# Patient Record
Sex: MALE | Race: BLACK OR AFRICAN AMERICAN | NOT HISPANIC OR LATINO | URBAN - METROPOLITAN AREA
[De-identification: names, ages, dates, MRNs, and addresses within clinical notes are randomized per-mention and may not be internally consistent; named-entity substitution may affect disease eponyms.]

---

## 2024-10-18 ENCOUNTER — APPOINTMENT (OUTPATIENT)
Dept: ORTHOPEDIC SURGERY | Facility: CLINIC | Age: 64
End: 2024-10-18
Payer: MEDICARE

## 2024-10-23 ENCOUNTER — HOSPITAL ENCOUNTER (OUTPATIENT)
Dept: RADIOLOGY | Facility: CLINIC | Age: 64
Discharge: HOME | End: 2024-10-23
Payer: MEDICARE

## 2024-10-23 ENCOUNTER — APPOINTMENT (OUTPATIENT)
Dept: ORTHOPEDIC SURGERY | Facility: CLINIC | Age: 64
End: 2024-10-23
Payer: MEDICARE

## 2024-10-23 ENCOUNTER — OFFICE VISIT (OUTPATIENT)
Dept: ORTHOPEDIC SURGERY | Facility: CLINIC | Age: 64
End: 2024-10-23
Payer: MEDICARE

## 2024-10-23 DIAGNOSIS — S82.202S CLOSED FRACTURE OF LEFT TIBIA AND FIBULA, SEQUELA: Primary | ICD-10-CM

## 2024-10-23 DIAGNOSIS — S82.402S CLOSED FRACTURE OF LEFT TIBIA AND FIBULA, SEQUELA: ICD-10-CM

## 2024-10-23 DIAGNOSIS — S82.402S CLOSED FRACTURE OF LEFT TIBIA AND FIBULA, SEQUELA: Primary | ICD-10-CM

## 2024-10-23 DIAGNOSIS — S82.202S CLOSED FRACTURE OF LEFT TIBIA AND FIBULA, SEQUELA: ICD-10-CM

## 2024-10-23 PROCEDURE — 73560 X-RAY EXAM OF KNEE 1 OR 2: CPT | Mod: LT

## 2024-10-23 PROCEDURE — 73590 X-RAY EXAM OF LOWER LEG: CPT | Mod: LT

## 2024-10-23 PROCEDURE — 99213 OFFICE O/P EST LOW 20 MIN: CPT | Performed by: PHYSICIAN ASSISTANT

## 2024-10-23 RX ORDER — ALBUTEROL SULFATE 90 UG/1
INHALANT RESPIRATORY (INHALATION)
COMMUNITY
Start: 2024-10-18

## 2024-10-23 RX ORDER — HYDROCHLOROTHIAZIDE 25 MG/1
1 TABLET ORAL DAILY
COMMUNITY

## 2024-10-23 RX ORDER — AMLODIPINE BESYLATE 5 MG/1
1 TABLET ORAL DAILY
COMMUNITY

## 2024-10-23 RX ORDER — HYDROXYZINE HYDROCHLORIDE 10 MG/1
TABLET, FILM COATED ORAL
COMMUNITY
Start: 2024-10-18

## 2024-10-23 RX ORDER — ATORVASTATIN CALCIUM 40 MG/1
40 TABLET, FILM COATED ORAL NIGHTLY
COMMUNITY
Start: 2024-06-03

## 2024-10-23 RX ORDER — HYDROCODONE BITARTRATE AND ACETAMINOPHEN 5; 325 MG/1; MG/1
TABLET ORAL
COMMUNITY
Start: 2023-10-28

## 2024-10-23 RX ORDER — CYCLOBENZAPRINE HCL 10 MG
TABLET ORAL 3 TIMES DAILY PRN
COMMUNITY

## 2024-10-23 ASSESSMENT — PAIN SCALES - GENERAL: PAINLEVEL_OUTOF10: 9

## 2024-10-23 ASSESSMENT — PAIN - FUNCTIONAL ASSESSMENT: PAIN_FUNCTIONAL_ASSESSMENT: 0-10

## 2024-10-23 ASSESSMENT — PAIN DESCRIPTION - DESCRIPTORS: DESCRIPTORS: ACHING

## 2024-10-24 NOTE — PROGRESS NOTES
History of Present Illness  Ted Tijerina is a 64 y.o. known to our office for history of proximal left tibial shaft fracture status post ORIF/IM nail on 10/8/2019.  He is coming today for complaints of left knee and ankle pain.  His pain is mostly noticed with change in weather and described as an aching/pulling sensation that is increased with range of motion and improved by nothing.  He has had the pain intermittently over the past couple of years but as of more recently it has become pretty severe.  He has had no new trauma or injury.  He has no other complaints or concerns at this time.     History reviewed. No pertinent past medical history.    Medication Documentation Review Audit       Reviewed by Brandan Haley on 10/23/24 at 1550      Medication Order Taking? Sig Documenting Provider Last Dose Status   albuterol 90 mcg/actuation inhaler [id number] Yes  Historical Provider, MD  Active   amLODIPine (Norvasc) 5 mg tablet [id number]  Take 1 tablet (5 mg) by mouth once daily. Historical Provider, MD  Active   atorvastatin (Lipitor) 40 mg tablet [id number] Yes Take 1 tablet (40 mg) by mouth once daily at bedtime. Historical Provider, MD  Active   cyclobenzaprine (Flexeril) 10 mg tablet [id number]  Take by mouth 3 times a day as needed. Historical Provider, MD  Active   hydroCHLOROthiazide (HYDRODiuril) 25 mg tablet [id number]  Take 1 tablet (25 mg) by mouth once daily. Historical Provider, MD  Active   HYDROcodone-acetaminophen (Norco) 5-325 mg tablet [id number] Yes TAKE 1 TABLET BY MOUTH EVERY 8 HOURS AS NEEDED FOR PAIN FOR UP TO 1 DAY. Historical Provider, MD  Active   hydrOXYzine HCL (Atarax) 10 mg tablet [id number] Yes  Historical Provider, MD  Active                    No Known Allergies    Social History     Socioeconomic History    Marital status:      Spouse name: Not on file    Number of children: Not on file    Years of education: Not on file    Highest education level: Not on file   Occupational  History    Not on file   Tobacco Use    Smoking status: Not on file    Smokeless tobacco: Not on file   Substance and Sexual Activity    Alcohol use: Not on file    Drug use: Not on file    Sexual activity: Not on file   Other Topics Concern    Not on file   Social History Narrative    Not on file     Social Drivers of Health     Financial Resource Strain: Not on file   Food Insecurity: Not on file   Transportation Needs: Not on file   Physical Activity: Not on file   Stress: Not on file   Social Connections: Not on file   Intimate Partner Violence: Not on file   Housing Stability: Not on file       History reviewed. No pertinent surgical history.     Review of Systems   30 point ROS reviewed and negative other than as listed in the HPI.      Exam  Gen: The pt is A&Ox3, NAD, and appear state age and weight  Psychiatric: mood and affect are appropriate   Eyes: sclera are white, EOM grossly intact  ENT: MMM  Neck: supple, thyroid is midline  Respiratory: respirations are nonlabored, chest rise symmetric  CV: rate is regular by palpation of distal pulses  Abdomen: nondistended   Integument: no obvious cutaneous lesions noted. No signs of lymphangitis. No signs of systemic edema.   MSK:  Left lower extremity with multiple well-healed surgical incisions.  He has full range of motion of the knee and ankle.  He does have prominent hardware in his proximal tibia as well as his medial ankle with tenderness to palpation over top.  No swelling appreciated.  He has 5 out of 5 strength throughout the left lower extremity.    Intact flexion and extension of digits  Neurovascular exam normal distally  2+ dorsalis pedis pulse and good cap refill     Imaging:  I personally reviewed multiple views of the left knee and tibia were obtained in the office today demonstrate well-healed fracture of the left proximal tibia with intact hardware that is unchanged in alignment from previous imaging obtained in April 2021     Assessment    64-year-old male with past surgical history of left tibia ORIF/IM nail with left knee and ankle pain    Plan:  I reviewed his imaging in detail with him today.  His injury is all well-healed.  He does have some arthritis in his knee on imaging but his pain seems to be more over the proximal tibia than it does his knee.  We discussed the possibility of hardware removal but that there is no guarantee that this will resolve his pain.  The risk and benefit of surgery was discussed and he verbalized interest in proceeding with hardware removal.  I will review his case with Dr. Camacho to determine if he thinks it would be appropriate to try and give the patient a call back.  In the meantime he can continue over-the-counter medications as needed for pain and activity as tolerated.    Natural history reviewed. All of the pt questions/concerns addressed and they are in agreement with the plan.

## 2024-10-29 ENCOUNTER — PREP FOR PROCEDURE (OUTPATIENT)
Dept: OPERATING ROOM | Facility: HOSPITAL | Age: 64
End: 2024-10-29
Payer: MEDICARE

## 2025-01-03 ENCOUNTER — PRE-ADMISSION TESTING (OUTPATIENT)
Dept: PREADMISSION TESTING | Facility: HOSPITAL | Age: 65
End: 2025-01-03
Payer: MEDICARE

## 2025-01-03 VITALS
SYSTOLIC BLOOD PRESSURE: 135 MMHG | RESPIRATION RATE: 16 BRPM | BODY MASS INDEX: 23.08 KG/M2 | OXYGEN SATURATION: 99 % | DIASTOLIC BLOOD PRESSURE: 89 MMHG | TEMPERATURE: 98.4 F | HEART RATE: 90 BPM | HEIGHT: 73 IN | WEIGHT: 174.16 LBS

## 2025-01-03 DIAGNOSIS — Z96.642 PAIN DUE TO LEFT HIP JOINT PROSTHESIS, INITIAL ENCOUNTER (CMS-HCC): ICD-10-CM

## 2025-01-03 DIAGNOSIS — Z79.899 LONG TERM USE OF DRUG: ICD-10-CM

## 2025-01-03 DIAGNOSIS — T84.84XA PAIN DUE TO LEFT HIP JOINT PROSTHESIS, INITIAL ENCOUNTER (CMS-HCC): ICD-10-CM

## 2025-01-03 DIAGNOSIS — Z01.818 PREOP TESTING: Primary | ICD-10-CM

## 2025-01-03 LAB
AMPHETAMINES UR QL SCN: ABNORMAL
ANION GAP SERPL CALC-SCNC: 12 MMOL/L (ref 10–20)
ATRIAL RATE: 88 BPM
BARBITURATES UR QL SCN: ABNORMAL
BASOPHILS # BLD AUTO: 0.02 X10*3/UL (ref 0–0.1)
BASOPHILS NFR BLD AUTO: 0.3 %
BENZODIAZ UR QL SCN: ABNORMAL
BUN SERPL-MCNC: 10 MG/DL (ref 6–23)
BZE UR QL SCN: ABNORMAL
CALCIUM SERPL-MCNC: 10.5 MG/DL (ref 8.6–10.3)
CANNABINOIDS UR QL SCN: ABNORMAL
CHLORIDE SERPL-SCNC: 100 MMOL/L (ref 98–107)
CO2 SERPL-SCNC: 30 MMOL/L (ref 21–32)
CREAT SERPL-MCNC: 0.87 MG/DL (ref 0.5–1.3)
EGFRCR SERPLBLD CKD-EPI 2021: >90 ML/MIN/1.73M*2
EOSINOPHIL # BLD AUTO: 0.07 X10*3/UL (ref 0–0.7)
EOSINOPHIL NFR BLD AUTO: 0.9 %
ERYTHROCYTE [DISTWIDTH] IN BLOOD BY AUTOMATED COUNT: 17.6 % (ref 11.5–14.5)
FENTANYL+NORFENTANYL UR QL SCN: ABNORMAL
GLUCOSE SERPL-MCNC: 101 MG/DL (ref 74–99)
HCT VFR BLD AUTO: 43.8 % (ref 41–52)
HGB BLD-MCNC: 13.8 G/DL (ref 13.5–17.5)
IMM GRANULOCYTES # BLD AUTO: 0.01 X10*3/UL (ref 0–0.7)
IMM GRANULOCYTES NFR BLD AUTO: 0.1 % (ref 0–0.9)
LYMPHOCYTES # BLD AUTO: 2.06 X10*3/UL (ref 1.2–4.8)
LYMPHOCYTES NFR BLD AUTO: 25.9 %
MCH RBC QN AUTO: 22.2 PG (ref 26–34)
MCHC RBC AUTO-ENTMCNC: 31.5 G/DL (ref 32–36)
MCV RBC AUTO: 71 FL (ref 80–100)
METHADONE UR QL SCN: ABNORMAL
MONOCYTES # BLD AUTO: 0.71 X10*3/UL (ref 0.1–1)
MONOCYTES NFR BLD AUTO: 8.9 %
NEUTROPHILS # BLD AUTO: 5.08 X10*3/UL (ref 1.2–7.7)
NEUTROPHILS NFR BLD AUTO: 63.9 %
NRBC BLD-RTO: ABNORMAL /100{WBCS}
OPIATES UR QL SCN: ABNORMAL
OXYCODONE+OXYMORPHONE UR QL SCN: ABNORMAL
P AXIS: 69 DEGREES
P OFFSET: 172 MS
P ONSET: 115 MS
PCP UR QL SCN: ABNORMAL
PLATELET # BLD AUTO: 297 X10*3/UL (ref 150–450)
POTASSIUM SERPL-SCNC: 3.1 MMOL/L (ref 3.5–5.3)
PR INTERVAL: 210 MS
Q ONSET: 220 MS
QRS COUNT: 14 BEATS
QRS DURATION: 92 MS
QT INTERVAL: 362 MS
QTC CALCULATION(BAZETT): 438 MS
QTC FREDERICIA: 411 MS
R AXIS: 50 DEGREES
RBC # BLD AUTO: 6.21 X10*6/UL (ref 4.5–5.9)
SODIUM SERPL-SCNC: 139 MMOL/L (ref 136–145)
T AXIS: 4 DEGREES
T OFFSET: 401 MS
VENTRICULAR RATE: 88 BPM
WBC # BLD AUTO: 8 X10*3/UL (ref 4.4–11.3)

## 2025-01-03 PROCEDURE — 80349 CANNABINOIDS NATURAL: CPT | Mod: BEALAB

## 2025-01-03 PROCEDURE — 99204 OFFICE O/P NEW MOD 45 MIN: CPT | Performed by: NURSE PRACTITIONER

## 2025-01-03 PROCEDURE — 36415 COLL VENOUS BLD VENIPUNCTURE: CPT

## 2025-01-03 PROCEDURE — 93010 ELECTROCARDIOGRAM REPORT: CPT | Performed by: INTERNAL MEDICINE

## 2025-01-03 PROCEDURE — 80048 BASIC METABOLIC PNL TOTAL CA: CPT

## 2025-01-03 PROCEDURE — 80307 DRUG TEST PRSMV CHEM ANLYZR: CPT | Mod: BEALAB

## 2025-01-03 PROCEDURE — 85025 COMPLETE CBC W/AUTO DIFF WBC: CPT

## 2025-01-03 PROCEDURE — 93005 ELECTROCARDIOGRAM TRACING: CPT

## 2025-01-03 RX ORDER — LISINOPRIL 2.5 MG/1
2.5 TABLET ORAL DAILY
COMMUNITY

## 2025-01-03 ASSESSMENT — DUKE ACTIVITY SCORE INDEX (DASI)
CAN YOU WALK A BLOCK OR TWO ON LEVEL GROUND: NO
CAN YOU CLIMB A FLIGHT OF STAIRS OR WALK UP A HILL: YES
CAN YOU DO YARD WORK LIKE RAKING LEAVES, WEEDING OR PUSHING A MOWER: NO
CAN YOU DO LIGHT WORK AROUND THE HOUSE LIKE DUSTING OR WASHING DISHES: NO
CAN YOU WALK INDOORS, SUCH AS AROUND YOUR HOUSE: YES
CAN YOU PARTICIPATE IN STRENOUS SPORTS LIKE SWIMMING, SINGLES TENNIS, FOOTBALL, BASKETBALL, OR SKIING: NO
DASI METS SCORE: 3.6
CAN YOU DO MODERATE WORK AROUND THE HOUSE LIKE VACUUMING, SWEEPING FLOORS OR CARRYING GROCERIES: NO
CAN YOU TAKE CARE OF YOURSELF (EAT, DRESS, BATHE, OR USE TOILET): NO
CAN YOU DO HEAVY WORK AROUND THE HOUSE LIKE SCRUBBING FLOORS OR LIFTING AND MOVING HEAVY FURNITURE: NO
TOTAL_SCORE: 7.25
CAN YOU PARTICIPATE IN MODERATE RECREATIONAL ACTIVITIES LIKE GOLF, BOWLING, DANCING, DOUBLES TENNIS OR THROWING A BASEBALL OR FOOTBALL: NO
CAN YOU HAVE SEXUAL RELATIONS: NO
CAN YOU RUN A SHORT DISTANCE: NO

## 2025-01-03 ASSESSMENT — ENCOUNTER SYMPTOMS
ARTHRALGIAS: 1
NECK NEGATIVE: 1
RESPIRATORY NEGATIVE: 1
ENDOCRINE NEGATIVE: 1
EYES NEGATIVE: 1
GASTROINTESTINAL NEGATIVE: 1
MYALGIAS: 1
CARDIOVASCULAR NEGATIVE: 1
CONSTITUTIONAL NEGATIVE: 1
NEUROLOGICAL NEGATIVE: 1

## 2025-01-03 ASSESSMENT — LIFESTYLE VARIABLES: SMOKING_STATUS: SMOKER

## 2025-01-03 ASSESSMENT — PAIN SCALES - GENERAL: PAINLEVEL_OUTOF10: 10 - WORST POSSIBLE PAIN

## 2025-01-03 ASSESSMENT — PAIN - FUNCTIONAL ASSESSMENT: PAIN_FUNCTIONAL_ASSESSMENT: 0-10

## 2025-01-03 NOTE — CPM/PAT H&P
CPM/PAT Evaluation       Name: Ted Tijerina (Ted Tijerina)  /Age: 1960/64 y.o.     Visit Type:   In-Person       Chief Complaint: Pain due to L  hip joint prosthesis    HPI 65 yo male who is referred to PAT by Dr Camacho for evaluation in advance of his LLE hardware removal 25. He has a h/o ORIF/IM nail 10/8/2019 after sustaining a tibial shaft fracture. He now c/o L knee and ankle pain, described as aching/puling sensation.    See PMH below    Past Medical History:   Diagnosis Date    Arthritis     Asthma     Cerebral vascular accident (Multi)         COPD (chronic obstructive pulmonary disease) (Multi)     GERD (gastroesophageal reflux disease)     Hiatal hernia     Hyperlipidemia     Hypertension        Past Surgical History:   Procedure Laterality Date    ORIF TIBIA FRACTURE         Patient Sexual activity questions deferred to the physician.    Family History   Problem Relation Name Age of Onset    Diabetes Mother      Heart attack Father         No Known Allergies    Medication Documentation Review Audit       Reviewed by Marlene Martin RN (Registered Nurse) on 25 at 1325      Medication Order Taking? Sig Documenting Provider Last Dose Status   albuterol 90 mcg/actuation inhaler [id number] Yes  Historical Provider, MD 2025 Active   amLODIPine (Norvasc) 5 mg tablet [id number] Yes Take 1 tablet (5 mg) by mouth once daily. Historical Provider, MD 1/3/2025 Active   atorvastatin (Lipitor) 40 mg tablet [id number] Yes Take 1 tablet (40 mg) by mouth once daily at bedtime. Historical Provider, MD 1/3/2025 Active   cyclobenzaprine (Flexeril) 10 mg tablet [id number] Yes Take by mouth 3 times a day as needed. Historical Provider, MD Past Week Active   hydroCHLOROthiazide (HYDRODiuril) 25 mg tablet [id number] Yes Take 1 tablet (25 mg) by mouth once daily. Historical Provider, MD 1/3/2025 Active   HYDROcodone-acetaminophen (Norco) 5-325 mg tablet [id number] Yes TAKE 1 TABLET BY MOUTH EVERY 8 HOURS AS  "NEEDED FOR PAIN FOR UP TO 1 DAY. Historical Provider, MD Past Week Active   hydrOXYzine HCL (Atarax) 10 mg tablet [id number] Yes  Historical Provider, MD Past Week Active   lisinopril 2.5 mg tablet [id number] Yes Take 1 tablet (2.5 mg) by mouth once daily. Historical Provider, MD 1/3/2025 Active                         PAT ROS:   Constitutional:    He presents in a , he uses a walker at home.   neg    Neuro/Psych:   neg    Eyes:   neg    Ears:   neg    Nose:   neg    Mouth:   neg    Throat:   neg    Neck:   neg    Cardio:   neg    Respiratory:   neg    Endocrine:   neg    GI:   neg    :   neg    Musculoskeletal:    See HPI, he has back pain    arthralgias   myalgias  Hematologic:   neg    Skin:  neg        Physical Exam  Vitals and nursing note reviewed. Exam conducted with a chaperone present. Physical exam within normal limits.   Abdominal:      Comments: Abdominal hernia substernal    Psychiatric:      Comments: Shaking leg continuously stating he is in pain          PAT AIRWAY:   Airway:     Mallampati::  III    TM distance::  >3 FB    Neck ROM::  Full      Visit Vitals  /89   Pulse 90   Temp 36.9 °C (98.4 °F)   Resp 16   Ht 1.854 m (6' 1\")   Wt 79 kg (174 lb 2.6 oz)   SpO2 99%   BMI 22.98 kg/m²   Smoking Status Every Day   BSA 2.02 m²       DASI Risk Score      Flowsheet Row Pre-Admission Testing from 1/3/2025 in [facility]   Can you take care of yourself (eat, dress, bathe, or use toilet)?  0 filed at 01/03/2025 1414   Can you walk indoors, such as around your house? 1.75 filed at 01/03/2025 1414   Can you walk a block or two on level ground?  0 filed at 01/03/2025 1414   Can you climb a flight of stairs or walk up a hill? 5.5 filed at 01/03/2025 1414   Can you run a short distance? 0 filed at 01/03/2025 1414   Can you do light work around the house like dusting or washing dishes? 0 filed at 01/03/2025 1414   Can you do moderate work around the house like vacuuming, sweeping floors or " carrying groceries? 0 filed at 01/03/2025 1414   Can you do heavy work around the house like scrubbing floors or lifting and moving heavy furniture?  0 filed at 01/03/2025 1414   Can you do yard work like raking leaves, weeding or pushing a mower? 0 filed at 01/03/2025 1414   Can you have sexual relations? 0 filed at 01/03/2025 1414   Can you participate in moderate recreational activities like golf, bowling, dancing, doubles tennis or throwing a baseball or football? 0 filed at 01/03/2025 1414   Can you participate in strenous sports like swimming, singles tennis, football, basketball, or skiing? 0 filed at 01/03/2025 1414   DASI SCORE 7.25 filed at 01/03/2025 1414   METS Score (Will be calculated only when all the questions are answered) 3.6 filed at 01/03/2025 1414          Caprini DVT Assessment      Flowsheet Row Pre-Admission Testing from 1/3/2025 in [facility]   DVT Score 8 filed at 01/03/2025 1424   Surgical Factors Elective major lower extremity arthroplasty filed at 01/03/2025 1424   BMI 30 or less filed at 01/03/2025 1424          Modified Frailty Index    No data to display       CHADS2 Stroke Risk  Current as of 6 minutes ago        N/A 3 to 100%: High Risk   2 to < 3%: Medium Risk   0 to < 2%: Low Risk     Last Change: N/A          This score determines the patient's risk of having a stroke if the patient has atrial fibrillation.        This score is not applicable to this patient. Components are not calculated.          Revised Cardiac Risk Index      Flowsheet Row Pre-Admission Testing from 1/3/2025 in [facility]   High-Risk Surgery (Intraperitoneal, Intrathoracic,Suprainguinal vascular) 0 filed at 01/03/2025 1425   History of ischemic heart disease (History of MI, History of positive exercuse test, Current chest paint considered due to myocardial ischemia, Use of nitrate therapy, ECG with pathological Q Waves) 0 filed at 01/03/2025 1425   History of congestive heart  failure (pulmonary edemia, bilateral rales or S3 gallop, Paroxysmal nocturnal dyspnea, CXR showing pulmonary vascular redistribution) 0 filed at 01/03/2025 1425   History of cerebrovascular disease (Prior TIA or stroke) 1 filed at 01/03/2025 1425   Pre-operative insulin treatment 0 filed at 01/03/2025 1425   Pre-operative creatinine>2 mg/dl 0 filed at 01/03/2025 1425   Revised Cardiac Risk Calculator 1 filed at 01/03/2025 1425          Apfel Simplified Score      Flowsheet Row Pre-Admission Testing from 1/3/2025 in [facility]   Smoking status 0 filed at 01/03/2025 1425   History of motion sickness or PONV  0 filed at 01/03/2025 1425   Use of postoperative opioids 0 filed at 01/03/2025 1425   Gender - Female 0=No filed at 01/03/2025 1425   Apfel Simplified Score Calculator 0 filed at 01/03/2025 1425          Risk Analysis Index Results This Encounter    No data found in the last 10 encounters.       Stop Bang Score      Flowsheet Row Pre-Admission Testing from 1/3/2025 in [facility]   Do you snore loudly? 1 filed at 01/03/2025 1327   Do you often feel tired or fatigued after your sleep? 1 filed at 01/03/2025 1327   Has anyone ever observed you stop breathing in your sleep? 0 filed at 01/03/2025 1327   Do you have or are you being treated for high blood pressure? 1 filed at 01/03/2025 1327   Recent BMI (Calculated) 23 filed at 01/03/2025 1327   Is BMI greater than 35 kg/m2? 0=No filed at 01/03/2025 1327   Age older than 50 years old? 1=Yes filed at 01/03/2025 1327   Is your neck circumference greater than 17 inches (Male) or 16 inches (Female)? 0 filed at 01/03/2025 1327   Gender - Male 1=Yes filed at 01/03/2025 1327   STOP-BANG Total Score 5 filed at 01/03/2025 1327          Prodigy: High Risk  Total Score: 19              Prodigy Age Score      Prodigy Gender Score     Prodigy Previous Opioid Use Score           ARISCAT Score for Postoperative Pulmonary Complications      Flowsheet  Row Pre-Admission Testing from 1/3/2025 in [facility]   Age, years  3 filed at 01/03/2025 1426   Preoperative SpO2 0 filed at 01/03/2025 1426   Respiratory infection in the last month Either upper or lower (i.e., URI, bronchitis, pneumonia), with fever and antibiotic treatment 0 filed at 01/03/2025 1426   Preoperative anemoa (Hgb less than 10 g/dl) 0 filed at 01/03/2025 1426   Surgical incision  0 filed at 01/03/2025 1426   Duration of surgery  16 filed at 01/03/2025 1426   Emergency Procedure  0 filed at 01/03/2025 1426   ARISCAT Total Score  19 filed at 01/03/2025 1426          Lydia Perioperative Risk for Myocardial Infarction or Cardiac Arrest (JANICE)      Flowsheet Row Pre-Admission Testing from 1/3/2025 in [facility]   Age 1.26 filed at 01/03/2025 1426   Functional Status  0.65 filed at 01/03/2025 1426   ASA Class  -1.92 filed at 01/03/2025 1426   Creatinine 0 filed at 01/03/2025 1426   Type of Procedure  0.80 filed at 01/03/2025 1426   JANICE Total Score  -4.46 filed at 01/03/2025 1426   JANICE % 1.14 filed at 01/03/2025 1426          Assessment and Plan   65 yo male presents to [facility] for risk assessment and preoperative optimization in advance of orthopedic surgery. Today his VS are stable, he is afebrile, his pulse ox is 98% on room air at rest. States his knee pain in 10/10. His daughter is in attendance. Wife is on phone  via [employer].     Neuro:  No diagnosis or significant findings on chart review or clinical presentation and evaluation. He is at risk of stroke due to prior stroke and co morbidities    Wife states he has had a stroke. I do not see any neurology notes. I don't appreciate any weakness UE/LE's, speech clear.  CT head 10/5/2019 completed at time of hospitalization for trauma, per report:   IMPRESSION:  CT head:   Diffuse supratentorial cortical volume loss with supratentorial  nonspecific white matter changes. Likely remote lacunar  infarction-right temporal  lobe.   Right sphenoid sinus disease.   CT cervical spine:  Cervical spine degenerative changes.  Asymmetric fullness left piriform sinus, recommend ENT follow-up.   CT abdomen/pelvis:   Concern for gallbladder sludge.   Left adrenal gland hyperplasia versus adenoma.   Retained colonic fecal debris. Some submucosal edema ascending colon  correlate if history of colitis/enteritis.  Large right hip lipomatous structure, recommend follow-up    HEENT/Airway:  No diagnosis or significant findings on chart review or clinical presentation and evaluation.     Cardiovascular:  Denies chest pain, shortness of breathe, dizziness, palpations, or lightheadedness    HTN- BP is stable, continue Amlodipine as prescribed, continue lisinopril as prescribed since it is a night med last dose will be 1/15/25 evening    HPL - No recent lipid panel- continue atorvastatin as prescribed    Acceptable surgical risk. No additional preoperative testing is currently indicated.    EKG - 1/3/25 preliminary  SR with 1st degree AVB, nonspecific ST and T wave no comparison    Pulmonary:  COPD, uses Pro Air as rescue inhaler if needed  Currently smoking  Patient has history of nicotine dependency. Smoking cessation education was provided to the patient.Cessation encouraged. - Physiological and physical aspects of tobacco addiction as well as strategies for quitting were discussed. - Counseling was given focusing on the harmful effects of this addiction especially given the patient's medical condition(s) which will be worsened because of the chemicals in tobacco    PRODIGY: High risk for opioid induced respiratory depression  Discussed smoking cessation and deep breathing handout given    Renal:   No diagnosis or significant findings on chart review, or clinical presentation and evaluation.     Pt at Moderate risk for perioperative RUBINA based on Dynamic Predictive Scoring Tool for Perioperative RUBINA  Lab Results   Component Value Date    GLUCOSE 86  "10/09/2019    CALCIUM 8.6 10/09/2019     (L) 10/09/2019    K 3.7 10/09/2019    CO2 27 10/09/2019    CL 99 10/09/2019    BUN 9 10/09/2019    CREATININE 0.74 10/09/2019       Endocrine:  No diagnosis or significant findings on chart review or clinical presentation and evaluation.     No results found for: \"HGBA1C\"    Hematologic:  No diagnosis or significant findings on chart review or clinical presentation and evaluation.  Lab Results   Component Value Date    WBC 12.0 (H) 10/08/2019    HGB 11.1 (L) 10/08/2019    HCT 34.5 (L) 10/08/2019    MCV 78 (L) 10/08/2019     10/08/2019       Pt supplied education/VTE handout    Gastrointestinal:   No diagnosis or significant findings on chart review or clinical presentation and evaluation.   EAT-10 score of 0 - self-perceived oropharyngeal dysphagia scale (0-40)      :  No diagnosis or significant findings on chart review or clinical presentation and evaluation.     Infectious disease:   No diagnosis or significant findings on chart review or clinical presentation and evaluation.     Musculoskeletal:   See HPI    Preoperative risk assessment  ASA III  NSQIP - Predicted length of stay days 0-1  PAT Testing - cbc, EKG, bmp, drug screen    Anesthesia:  No anesthesia complications    denies dental issues  Smoker-current  Drugs- marijuana  ETOH-denies  denies personal/family issues with Anesthesia    Face to Face patient contact time 45 min    KAYLA Youngblood-CNP 1/3/2025 2:27 PM          "

## 2025-01-03 NOTE — H&P (VIEW-ONLY)
CPM/PAT Evaluation       Name: Ted Tijerina (Ted Tijerina)  /Age: 1960/64 y.o.     Visit Type:   In-Person       Chief Complaint: Pain due to L  hip joint prosthesis    HPI 65 yo male who is referred to PAT by Dr Camacho for evaluation in advance of his LLE hardware removal 25. He has a h/o ORIF/IM nail 10/8/2019 after sustaining a tibial shaft fracture. He now c/o L knee and ankle pain, described as aching/puling sensation.    See PMH below    Past Medical History:   Diagnosis Date    Arthritis     Asthma     Cerebral vascular accident (Multi)         COPD (chronic obstructive pulmonary disease) (Multi)     GERD (gastroesophageal reflux disease)     Hiatal hernia     Hyperlipidemia     Hypertension        Past Surgical History:   Procedure Laterality Date    ORIF TIBIA FRACTURE         Patient Sexual activity questions deferred to the physician.    Family History   Problem Relation Name Age of Onset    Diabetes Mother      Heart attack Father         No Known Allergies    Medication Documentation Review Audit       Reviewed by Marlene Martin RN (Registered Nurse) on 25 at 1325      Medication Order Taking? Sig Documenting Provider Last Dose Status   albuterol 90 mcg/actuation inhaler [id number] Yes  Historical Provider, MD 2025 Active   amLODIPine (Norvasc) 5 mg tablet [id number] Yes Take 1 tablet (5 mg) by mouth once daily. Historical Provider, MD 1/3/2025 Active   atorvastatin (Lipitor) 40 mg tablet [id number] Yes Take 1 tablet (40 mg) by mouth once daily at bedtime. Historical Provider, MD 1/3/2025 Active   cyclobenzaprine (Flexeril) 10 mg tablet [id number] Yes Take by mouth 3 times a day as needed. Historical Provider, MD Past Week Active   hydroCHLOROthiazide (HYDRODiuril) 25 mg tablet [id number] Yes Take 1 tablet (25 mg) by mouth once daily. Historical Provider, MD 1/3/2025 Active   HYDROcodone-acetaminophen (Norco) 5-325 mg tablet [id number] Yes TAKE 1 TABLET BY MOUTH EVERY 8 HOURS AS  "NEEDED FOR PAIN FOR UP TO 1 DAY. Historical Provider, MD Past Week Active   hydrOXYzine HCL (Atarax) 10 mg tablet [id number] Yes  Historical Provider, MD Past Week Active   lisinopril 2.5 mg tablet [id number] Yes Take 1 tablet (2.5 mg) by mouth once daily. Historical Provider, MD 1/3/2025 Active                         PAT ROS:   Constitutional:    He presents in a , he uses a walker at home.   neg    Neuro/Psych:   neg    Eyes:   neg    Ears:   neg    Nose:   neg    Mouth:   neg    Throat:   neg    Neck:   neg    Cardio:   neg    Respiratory:   neg    Endocrine:   neg    GI:   neg    :   neg    Musculoskeletal:    See HPI, he has back pain    arthralgias   myalgias  Hematologic:   neg    Skin:  neg        Physical Exam  Vitals and nursing note reviewed. Exam conducted with a chaperone present. Physical exam within normal limits.   Abdominal:      Comments: Abdominal hernia substernal    Psychiatric:      Comments: Shaking leg continuously stating he is in pain          PAT AIRWAY:   Airway:     Mallampati::  III    TM distance::  >3 FB    Neck ROM::  Full      Visit Vitals  /89   Pulse 90   Temp 36.9 °C (98.4 °F)   Resp 16   Ht 1.854 m (6' 1\")   Wt 79 kg (174 lb 2.6 oz)   SpO2 99%   BMI 22.98 kg/m²   Smoking Status Every Day   BSA 2.02 m²       DASI Risk Score      Flowsheet Row Pre-Admission Testing from 1/3/2025 in [facility]   Can you take care of yourself (eat, dress, bathe, or use toilet)?  0 filed at 01/03/2025 1414   Can you walk indoors, such as around your house? 1.75 filed at 01/03/2025 1414   Can you walk a block or two on level ground?  0 filed at 01/03/2025 1414   Can you climb a flight of stairs or walk up a hill? 5.5 filed at 01/03/2025 1414   Can you run a short distance? 0 filed at 01/03/2025 1414   Can you do light work around the house like dusting or washing dishes? 0 filed at 01/03/2025 1414   Can you do moderate work around the house like vacuuming, sweeping floors or " carrying groceries? 0 filed at 01/03/2025 1414   Can you do heavy work around the house like scrubbing floors or lifting and moving heavy furniture?  0 filed at 01/03/2025 1414   Can you do yard work like raking leaves, weeding or pushing a mower? 0 filed at 01/03/2025 1414   Can you have sexual relations? 0 filed at 01/03/2025 1414   Can you participate in moderate recreational activities like golf, bowling, dancing, doubles tennis or throwing a baseball or football? 0 filed at 01/03/2025 1414   Can you participate in strenous sports like swimming, singles tennis, football, basketball, or skiing? 0 filed at 01/03/2025 1414   DASI SCORE 7.25 filed at 01/03/2025 1414   METS Score (Will be calculated only when all the questions are answered) 3.6 filed at 01/03/2025 1414          Caprini DVT Assessment      Flowsheet Row Pre-Admission Testing from 1/3/2025 in [facility]   DVT Score 8 filed at 01/03/2025 1424   Surgical Factors Elective major lower extremity arthroplasty filed at 01/03/2025 1424   BMI 30 or less filed at 01/03/2025 1424          Modified Frailty Index    No data to display       CHADS2 Stroke Risk  Current as of 6 minutes ago        N/A 3 to 100%: High Risk   2 to < 3%: Medium Risk   0 to < 2%: Low Risk     Last Change: N/A          This score determines the patient's risk of having a stroke if the patient has atrial fibrillation.        This score is not applicable to this patient. Components are not calculated.          Revised Cardiac Risk Index      Flowsheet Row Pre-Admission Testing from 1/3/2025 in [facility]   High-Risk Surgery (Intraperitoneal, Intrathoracic,Suprainguinal vascular) 0 filed at 01/03/2025 1425   History of ischemic heart disease (History of MI, History of positive exercuse test, Current chest paint considered due to myocardial ischemia, Use of nitrate therapy, ECG with pathological Q Waves) 0 filed at 01/03/2025 1425   History of congestive heart  failure (pulmonary edemia, bilateral rales or S3 gallop, Paroxysmal nocturnal dyspnea, CXR showing pulmonary vascular redistribution) 0 filed at 01/03/2025 1425   History of cerebrovascular disease (Prior TIA or stroke) 1 filed at 01/03/2025 1425   Pre-operative insulin treatment 0 filed at 01/03/2025 1425   Pre-operative creatinine>2 mg/dl 0 filed at 01/03/2025 1425   Revised Cardiac Risk Calculator 1 filed at 01/03/2025 1425          Apfel Simplified Score      Flowsheet Row Pre-Admission Testing from 1/3/2025 in [facility]   Smoking status 0 filed at 01/03/2025 1425   History of motion sickness or PONV  0 filed at 01/03/2025 1425   Use of postoperative opioids 0 filed at 01/03/2025 1425   Gender - Female 0=No filed at 01/03/2025 1425   Apfel Simplified Score Calculator 0 filed at 01/03/2025 1425          Risk Analysis Index Results This Encounter    No data found in the last 10 encounters.       Stop Bang Score      Flowsheet Row Pre-Admission Testing from 1/3/2025 in [facility]   Do you snore loudly? 1 filed at 01/03/2025 1327   Do you often feel tired or fatigued after your sleep? 1 filed at 01/03/2025 1327   Has anyone ever observed you stop breathing in your sleep? 0 filed at 01/03/2025 1327   Do you have or are you being treated for high blood pressure? 1 filed at 01/03/2025 1327   Recent BMI (Calculated) 23 filed at 01/03/2025 1327   Is BMI greater than 35 kg/m2? 0=No filed at 01/03/2025 1327   Age older than 50 years old? 1=Yes filed at 01/03/2025 1327   Is your neck circumference greater than 17 inches (Male) or 16 inches (Female)? 0 filed at 01/03/2025 1327   Gender - Male 1=Yes filed at 01/03/2025 1327   STOP-BANG Total Score 5 filed at 01/03/2025 1327          Prodigy: High Risk  Total Score: 19              Prodigy Age Score      Prodigy Gender Score     Prodigy Previous Opioid Use Score           ARISCAT Score for Postoperative Pulmonary Complications      Flowsheet  Row Pre-Admission Testing from 1/3/2025 in [facility]   Age, years  3 filed at 01/03/2025 1426   Preoperative SpO2 0 filed at 01/03/2025 1426   Respiratory infection in the last month Either upper or lower (i.e., URI, bronchitis, pneumonia), with fever and antibiotic treatment 0 filed at 01/03/2025 1426   Preoperative anemoa (Hgb less than 10 g/dl) 0 filed at 01/03/2025 1426   Surgical incision  0 filed at 01/03/2025 1426   Duration of surgery  16 filed at 01/03/2025 1426   Emergency Procedure  0 filed at 01/03/2025 1426   ARISCAT Total Score  19 filed at 01/03/2025 1426          Lydia Perioperative Risk for Myocardial Infarction or Cardiac Arrest (JANICE)      Flowsheet Row Pre-Admission Testing from 1/3/2025 in [facility]   Age 1.26 filed at 01/03/2025 1426   Functional Status  0.65 filed at 01/03/2025 1426   ASA Class  -1.92 filed at 01/03/2025 1426   Creatinine 0 filed at 01/03/2025 1426   Type of Procedure  0.80 filed at 01/03/2025 1426   JANICE Total Score  -4.46 filed at 01/03/2025 1426   JANICE % 1.14 filed at 01/03/2025 1426          Assessment and Plan   65 yo male presents to [facility] for risk assessment and preoperative optimization in advance of orthopedic surgery. Today his VS are stable, he is afebrile, his pulse ox is 98% on room air at rest. States his knee pain in 10/10. His daughter is in attendance. Wife is on phone  via [employer].     Neuro:  No diagnosis or significant findings on chart review or clinical presentation and evaluation. He is at risk of stroke due to prior stroke and co morbidities    Wife states he has had a stroke. I do not see any neurology notes. I don't appreciate any weakness UE/LE's, speech clear.  CT head 10/5/2019 completed at time of hospitalization for trauma, per report:   IMPRESSION:  CT head:   Diffuse supratentorial cortical volume loss with supratentorial  nonspecific white matter changes. Likely remote lacunar  infarction-right temporal  lobe.   Right sphenoid sinus disease.   CT cervical spine:  Cervical spine degenerative changes.  Asymmetric fullness left piriform sinus, recommend ENT follow-up.   CT abdomen/pelvis:   Concern for gallbladder sludge.   Left adrenal gland hyperplasia versus adenoma.   Retained colonic fecal debris. Some submucosal edema ascending colon  correlate if history of colitis/enteritis.  Large right hip lipomatous structure, recommend follow-up    HEENT/Airway:  No diagnosis or significant findings on chart review or clinical presentation and evaluation.     Cardiovascular:  Denies chest pain, shortness of breathe, dizziness, palpations, or lightheadedness    HTN- BP is stable, continue Amlodipine as prescribed, continue lisinopril as prescribed since it is a night med last dose will be 1/15/25 evening    HPL - No recent lipid panel- continue atorvastatin as prescribed    Acceptable surgical risk. No additional preoperative testing is currently indicated.    EKG - 1/3/25 preliminary  SR with 1st degree AVB, nonspecific ST and T wave no comparison    Pulmonary:  COPD, uses Pro Air as rescue inhaler if needed  Currently smoking  Patient has history of nicotine dependency. Smoking cessation education was provided to the patient.Cessation encouraged. - Physiological and physical aspects of tobacco addiction as well as strategies for quitting were discussed. - Counseling was given focusing on the harmful effects of this addiction especially given the patient's medical condition(s) which will be worsened because of the chemicals in tobacco    PRODIGY: High risk for opioid induced respiratory depression  Discussed smoking cessation and deep breathing handout given    Renal:   No diagnosis or significant findings on chart review, or clinical presentation and evaluation.     Pt at Moderate risk for perioperative RUBINA based on Dynamic Predictive Scoring Tool for Perioperative RUBINA  Lab Results   Component Value Date    GLUCOSE 86  "10/09/2019    CALCIUM 8.6 10/09/2019     (L) 10/09/2019    K 3.7 10/09/2019    CO2 27 10/09/2019    CL 99 10/09/2019    BUN 9 10/09/2019    CREATININE 0.74 10/09/2019       Endocrine:  No diagnosis or significant findings on chart review or clinical presentation and evaluation.     No results found for: \"HGBA1C\"    Hematologic:  No diagnosis or significant findings on chart review or clinical presentation and evaluation.  Lab Results   Component Value Date    WBC 12.0 (H) 10/08/2019    HGB 11.1 (L) 10/08/2019    HCT 34.5 (L) 10/08/2019    MCV 78 (L) 10/08/2019     10/08/2019       Pt supplied education/VTE handout    Gastrointestinal:   No diagnosis or significant findings on chart review or clinical presentation and evaluation.   EAT-10 score of 0 - self-perceived oropharyngeal dysphagia scale (0-40)      :  No diagnosis or significant findings on chart review or clinical presentation and evaluation.     Infectious disease:   No diagnosis or significant findings on chart review or clinical presentation and evaluation.     Musculoskeletal:   See HPI    Preoperative risk assessment  ASA III  NSQIP - Predicted length of stay days 0-1  PAT Testing - cbc, EKG, bmp, drug screen    Anesthesia:  No anesthesia complications    denies dental issues  Smoker-current  Drugs- marijuana  ETOH-denies  denies personal/family issues with Anesthesia    Face to Face patient contact time 45 min    KAYLA Youngblood-CNP 1/3/2025 2:27 PM          "

## 2025-01-03 NOTE — CPM/PAT PATIENT COMMUNICATION
CPM/PAT Patient Communication       Name: Ted Tijerina (Ted Tijerina)  /Age: 1960/64 y.o.     I contacted Mrs Tijerina regarding 3.1 serum K+, suggested she call his PCP.  KAYLA Youngblood-CNP

## 2025-01-03 NOTE — PREPROCEDURE INSTRUCTIONS
Medication List            Accurate as of January 3, 2025  1:51 PM. Always use your most recent med list.                albuterol 90 mcg/actuation inhaler  Medication Adjustments for Surgery: Take/Use as prescribed     amLODIPine 5 mg tablet  Commonly known as: Norvasc  Medication Adjustments for Surgery: Take/Use as prescribed     atorvastatin 40 mg tablet  Commonly known as: Lipitor  Medication Adjustments for Surgery: Take/Use as prescribed     cyclobenzaprine 10 mg tablet  Commonly known as: Flexeril  Medication Adjustments for Surgery: Take/Use as prescribed     hydroCHLOROthiazide 25 mg tablet  Commonly known as: HYDRODiuril  Medication Adjustments for Surgery: Do Not take on the morning of surgery     HYDROcodone-acetaminophen 5-325 mg tablet  Commonly known as: Norco  Medication Adjustments for Surgery: Take/Use as prescribed     hydrOXYzine HCL 10 mg tablet  Commonly known as: Atarax  Medication Adjustments for Surgery: Take/Use as prescribed     lisinopril 2.5 mg tablet  Medication Adjustments for Surgery: Take last dose 1 day (24 hours) before surgery  Notes to patient: If a morning medication Last dose 1/16/25 early morning, if an evening medication then last dose 1/15/25. DO NOT TAKE THE DAY OF SURGERY              If no issues with your liver you may take Tylenol 2-500 mg tablets every 8 hrs around the clock for pain including morning of surgery with a sip of water    STOP VITAMINS, SUPPLEMENTS, HERBS, PROBIOTICS, AND FISH OIL, NO MOTRIN OR ADVIL OR ALEVE 7 DAYS BEFORE SURGERY    IF YOU HAVE A CPAP MACHINE BRING ON DAY OF SURGERY     CONTACT SURGEON'S OFFICE IF YOU DEVELOP:  * Fever = 100.4 F   * New respiratory symptoms (e.g. cough, shortness of breath, respiratory distress, sore throat)  * Recent loss of taste or smell  *Flu like symptoms such as headache, fatigue or gastrointestinal symptoms  * You develop any open sores, shingles, burning or painful urination   AND/OR:  * You no longer wish to  have the surgery.  * Any other personal circumstances change that may lead to the need to cancel or defer this surgery.  *You were admitted to any hospital within one week of your planned procedure.     SMOKING:  *Quitting smoking can make a huge difference to your health and recovery from surgery.    *If you need help with quitting, call 4-290-QUIT-NOW.     Additional Instructions:      Seven/Six Days before Surgery:  Review your medication instructions, stop indicated medications  Five Days before Surgery:  Review your medication instructions, stop indicated medications  Begin using your Hibiclens, if prescribed  Three Days before Surgery:  Review your medication instructions, stop indicated medications  The Day before Surgery:  Start using 0.12% CHG mouthwash-if prescribed  No smoking or alcohol use 24 hours before surgery  Review your medication instructions, stop indicated medications  You will be contacted regarding the time of your arrival to facility and surgery time  Do not eat any food after Midnight  Day of Surgery:  Review your medication instructions, take indicated medications  If you have diabetes, please check your fasting blood sugar upon awakening.  If fasting blood sugar is <80 mg/dl, drink 100 ml of apple juice, time limit of 2 hours before     SURGICAL TIME:  *You will be contacted between 2 p.m. and 3 p.m. the business day before your surgery with your arrival time.  *If you haven't received a call by 3pm, call your surgeons office  *Scheduled surgery times may change and you will be notified if this occurs-check your personal voicemail for any updates.     ON THE MORNING OF SURGERY:  *Wear comfortable, loose fitting clothing.   *Do not use moisturizers, creams, lotions or perfume.  *All jewelry and valuables should be left at home.  *Prosthetic devices such as contact lenses, hearing aids, dentures, eyelash extensions, hairpins and body piercing must be removed before surgery.     BRING WITH  YOU:  *Photo ID and insurance card  *Current list of medications and allergies  *Pacemaker/Defibrillator/Heart stent cards  *CPAP machine and mask  *Slings/splints/crutches  *Copy of your complete Advanced Directive/DHPOA-if applicable  *Neurostimulator implant remote     PARKING AND ARRIVAL:  *Check in at the Main Entrance desk and let them know you are here for surgery.     IF YOU ARE HAVING OUTPATIENT/SAME DAY SURGERY:  *A responsible adult MUST accompany you at the time of discharge and stay with you for 24 hours after your surgery.  *You may NOT drive yourself home after surgery.  *You may use a taxi or ride sharing service ([employer], Uber) to return home ONLY if you are accompanied by a friend or family member.  *Instructions for resuming your medications will be provided by your surgeon.    Preoperative Fasting Guidelines     When do I need to stop eating before my surgery?  Do not eat any food after midnight the night before your surgery/procedure.    You may have up to 12 ounces of clear liquid until TWO hours before your instructed arrival time to the hospital.  This includes water, black tea/coffee, (no milk or cream) apple juice, and electrolyte drinks (Gatorade)  You may chew gum until TWO hours before your surgery/procedure    Preoperative Brain Exercises     What are brain exercises?  A brain exercise is any activity that engages your thinking (cognitive) skills.     What types of activities are considered brain exercises?  Jigsaw puzzles, crossword puzzles, word jumble, memory games, word search, and many more.  Many can be found free online or on your phone via a mobile syl.     Why should I do brain exercises before my surgery?  More recent research has shown brain exercise before surgery can lower the risk of postoperative delirium (confusion) which can be especially important for older adults.  Patients who did brain exercises for 5 to 10 hours the days before surgery, cut their risk of postoperative  delirium in half up to 1 week after surgery.                 [facility]   Preoperative Deep Breathing Exercises     Why it is important to do deep breathing exercises before my surgery?  Deep breathing exercises strengthen your breathing muscles.  This helps you to recover after your surgery and decreases the chance of breathing complications.        How are the deep breathing exercises done?  Sit straight with your back supported.  Breathe in deeply and slowly through your nose. Your lower rib cage should expand and your abdomen may move forward.  Hold that breath for 3 to 5 seconds.  Breathe out through pursed lips, slowly and completely.  Rest and repeat 10 times every hour while awake.  Rest longer if you become dizzy or lightheaded.                [facility]   Preoperative Deep Breathing Exercises     Why it is important to do deep breathing exercises before my surgery?  Deep breathing exercises strengthen your breathing muscles.  This helps you to recover after your surgery and decreases the chance of breathing complications.        How are the deep breathing exercises done?  Sit straight with your back supported.  Breathe in deeply and slowly through your nose. Your lower rib cage should expand and your abdomen may move forward.  Hold that breath for 3 to 5 seconds.  Breathe out through pursed lips, slowly and completely.  Rest and repeat 10 times every hour while awake.  Rest longer if you become dizzy or lightheaded.        Patient Information: Incentive Spirometer  What is an incentive spirometer?  An incentive spirometer is a device used before and after surgery to “exercise” your lungs.  It helps you to take deeper breaths to expand your lungs.  Below is an example of a basic incentive spirometer.  The device you receive may differ slightly but they all function the same.    Why do I need to use an incentive spirometer?  Using your incentive  spirometer prepares your lungs for surgery and helps prevent lung problems after surgery.  How do I use my incentive spirometer?  When you're using your incentive spirometer, make sure to breathe through your mouth. If you breathe through your nose, the incentive spirometer won't work properly. You can hold your nose if you have trouble.  If you feel dizzy at any time, stop and rest. Try again at a later time.  Follow the steps below:  Set up your incentive spirometer, expand the flexible tubing and connect to the outlet.  Sit upright in a chair or bed. Hold the incentive spirometer at eye level.   Put the mouthpiece in your mouth and close your lips tightly around it. Slowly breathe out (exhale) completely.  Breathe in (inhale) slowly through your mouth as deeply as you can. As you take a breath, you will see the piston rise inside the large column. While the piston rises, the indicator should move upwards. It should stay in between the 2 arrows (see Figure).  Try to get the piston as high as you can, while keeping the indicator between the arrows.   If the indicator doesn't stay between the arrows, you're breathing either too fast or too slow.  When you get it as high as you can, hold your breath for 10 seconds, or as long as possible. While you're holding your breath, the piston will slowly fall to the base of the spirometer.  Once the piston reaches the bottom of the spirometer, breathe out slowly through your mouth. Rest for a few seconds.  Repeat 10 times. Try to get the piston to the same level with each breath.  Repeat every hour while awake  You can carefully clean the outside of the mouthpiece with an alcohol wipe or soap and water.       Patient and Family Education        Ways You Can Help Prevent Blood Clots                 This handout explains some simple things you can do to help prevent blood clots.      Blood clots are blockages that can form in the body's veins. When a blood clot forms in your  deep veins, it may be called a deep vein thrombosis, or DVT for short. Blood clots can happen in any part of the body where blood flows, but they are most common in the arms and legs. If a piece of a blood clot breaks free and travels to the lungs, it is called a pulmonary embolus (PE). A PE can be a very serious problem.       Being in the hospital or having surgery can raise your chances of getting a blood clot because you may not be well enough to move around as much as you normally do.         Ways you can help prevent blood clots in the hospital           Wearing SCDs. SCDs stands for Sequential Compression Devices.   SCDs are special sleeves that wrap around your legs  They attach to a pump that fills them with air to gently squeeze your legs every few minutes.   This helps return the blood in your legs to your heart.   SCDs should only be taken off when walking or bathing.   SCDs may not be comfortable, but they can help save your life.                                            Wearing compression stockings - if your doctor orders them. These special snug fitting stockings gently squeeze your legs to help blood flow.       Walking. Walking helps move the blood in your legs.   If your doctor says it is ok, try walking the halls at least   5 times a day. Ask us to help you get up, so you don't fall.      Taking any blood thinning medicines your doctor orders.        Page 1 of 2            [facility]; 3/23   Ways you can help prevent blood clots at home         Wearing compression stockings - if your doctor orders them. ? Walking - to help move the blood in your legs.       Taking any blood thinning medicines your doctor orders.      Signs of a blood clot or PE        Tell your doctor or nurse know right away if you have of the problems listed below.    If you are at home, seek medical care right away. Call 911 for chest pain or problems breathing.                Signs of a blood clot (DVT) - such as  pain,  swelling, redness or warmth in your arm or leg      Signs of a pulmonary embolism (PE) - such as chest pain or feeling short of breath       Thank you for coming to Pre Admission testing.      If I have prescribe medication please don't forget to  at your pharmacy.      Any questions about today's visit call [phone] and leave a message in the general mailbox.     Patient instructed to ambulate as soon as possible postoperatively to decrease thromboembolic risk.     Angela Tijerina, APRN-CNP     Thank you for visiting [facility].  If you have any changes to your health condition, please call the surgeons office to alert them and give them details of your symptoms.

## 2025-01-06 LAB
ATRIAL RATE: 88 BPM
P AXIS: 69 DEGREES
P OFFSET: 172 MS
P ONSET: 115 MS
PR INTERVAL: 210 MS
Q ONSET: 220 MS
QRS COUNT: 14 BEATS
QRS DURATION: 92 MS
QT INTERVAL: 362 MS
QTC CALCULATION(BAZETT): 438 MS
QTC FREDERICIA: 411 MS
R AXIS: 50 DEGREES
T AXIS: 4 DEGREES
T OFFSET: 401 MS
VENTRICULAR RATE: 88 BPM

## 2025-01-10 LAB — CARBOXYTHC UR-MCNC: >500 NG/ML

## 2025-01-17 ENCOUNTER — HOSPITAL ENCOUNTER (OUTPATIENT)
Facility: HOSPITAL | Age: 65
Setting detail: OUTPATIENT SURGERY
Discharge: HOME | End: 2025-01-17
Attending: ORTHOPAEDIC SURGERY | Admitting: ORTHOPAEDIC SURGERY
Payer: MEDICARE

## 2025-01-17 ENCOUNTER — ANESTHESIA EVENT (OUTPATIENT)
Dept: OPERATING ROOM | Facility: HOSPITAL | Age: 65
End: 2025-01-17
Payer: MEDICARE

## 2025-01-17 ENCOUNTER — APPOINTMENT (OUTPATIENT)
Dept: RADIOLOGY | Facility: HOSPITAL | Age: 65
End: 2025-01-17
Payer: MEDICARE

## 2025-01-17 ENCOUNTER — ANESTHESIA (OUTPATIENT)
Dept: OPERATING ROOM | Facility: HOSPITAL | Age: 65
End: 2025-01-17
Payer: MEDICARE

## 2025-01-17 VITALS
RESPIRATION RATE: 16 BRPM | HEART RATE: 78 BPM | OXYGEN SATURATION: 97 % | SYSTOLIC BLOOD PRESSURE: 139 MMHG | BODY MASS INDEX: 22.75 KG/M2 | DIASTOLIC BLOOD PRESSURE: 94 MMHG | TEMPERATURE: 97.3 F | WEIGHT: 172.4 LBS

## 2025-01-17 DIAGNOSIS — T84.84XA PAIN DUE TO LEFT HIP JOINT PROSTHESIS, INITIAL ENCOUNTER (CMS-HCC): Primary | ICD-10-CM

## 2025-01-17 DIAGNOSIS — T84.84XA PAINFUL ORTHOPAEDIC HARDWARE (CMS-HCC): ICD-10-CM

## 2025-01-17 DIAGNOSIS — Z96.642 PAIN DUE TO LEFT HIP JOINT PROSTHESIS, INITIAL ENCOUNTER (CMS-HCC): Primary | ICD-10-CM

## 2025-01-17 PROBLEM — K21.9 GASTROESOPHAGEAL REFLUX DISEASE: Status: ACTIVE | Noted: 2025-01-17

## 2025-01-17 PROBLEM — K44.9 HIATAL HERNIA: Status: ACTIVE | Noted: 2025-01-17

## 2025-01-17 PROBLEM — J44.9 CHRONIC OBSTRUCTIVE PULMONARY DISEASE (MULTI): Status: ACTIVE | Noted: 2025-01-17

## 2025-01-17 PROBLEM — I63.9 CVA (CEREBRAL VASCULAR ACCIDENT) (MULTI): Status: ACTIVE | Noted: 2025-01-17

## 2025-01-17 PROBLEM — J45.909 ASTHMA: Status: ACTIVE | Noted: 2025-01-17

## 2025-01-17 PROBLEM — E78.5 HYPERLIPIDEMIA: Status: ACTIVE | Noted: 2025-01-17

## 2025-01-17 PROBLEM — I10 HTN (HYPERTENSION): Status: ACTIVE | Noted: 2025-01-17

## 2025-01-17 PROCEDURE — 3600000009 HC OR TIME - EACH INCREMENTAL 1 MINUTE - PROCEDURE LEVEL FOUR: Performed by: ORTHOPAEDIC SURGERY

## 2025-01-17 PROCEDURE — 2500000004 HC RX 250 GENERAL PHARMACY W/ HCPCS (ALT 636 FOR OP/ED): Mod: JZ | Performed by: PHYSICIAN ASSISTANT

## 2025-01-17 PROCEDURE — 3700000001 HC GENERAL ANESTHESIA TIME - INITIAL BASE CHARGE: Performed by: ORTHOPAEDIC SURGERY

## 2025-01-17 PROCEDURE — 20680 REMOVAL OF IMPLANT DEEP: CPT | Performed by: ORTHOPAEDIC SURGERY

## 2025-01-17 PROCEDURE — 3700000002 HC GENERAL ANESTHESIA TIME - EACH INCREMENTAL 1 MINUTE: Performed by: ORTHOPAEDIC SURGERY

## 2025-01-17 PROCEDURE — 2500000005 HC RX 250 GENERAL PHARMACY W/O HCPCS

## 2025-01-17 PROCEDURE — 3600000004 HC OR TIME - INITIAL BASE CHARGE - PROCEDURE LEVEL FOUR: Performed by: ORTHOPAEDIC SURGERY

## 2025-01-17 PROCEDURE — 7100000009 HC PHASE TWO TIME - INITIAL BASE CHARGE: Performed by: ORTHOPAEDIC SURGERY

## 2025-01-17 PROCEDURE — 2500000004 HC RX 250 GENERAL PHARMACY W/ HCPCS (ALT 636 FOR OP/ED)

## 2025-01-17 PROCEDURE — 2500000004 HC RX 250 GENERAL PHARMACY W/ HCPCS (ALT 636 FOR OP/ED): Performed by: ORTHOPAEDIC SURGERY

## 2025-01-17 PROCEDURE — 7100000002 HC RECOVERY ROOM TIME - EACH INCREMENTAL 1 MINUTE: Performed by: ORTHOPAEDIC SURGERY

## 2025-01-17 PROCEDURE — 20680 REMOVAL OF IMPLANT DEEP: CPT | Performed by: PHYSICIAN ASSISTANT

## 2025-01-17 PROCEDURE — 7100000010 HC PHASE TWO TIME - EACH INCREMENTAL 1 MINUTE: Performed by: ORTHOPAEDIC SURGERY

## 2025-01-17 PROCEDURE — 7100000001 HC RECOVERY ROOM TIME - INITIAL BASE CHARGE: Performed by: ORTHOPAEDIC SURGERY

## 2025-01-17 PROCEDURE — 2500000005 HC RX 250 GENERAL PHARMACY W/O HCPCS: Performed by: ORTHOPAEDIC SURGERY

## 2025-01-17 RX ORDER — ONDANSETRON HYDROCHLORIDE 2 MG/ML
4 INJECTION, SOLUTION INTRAVENOUS ONCE AS NEEDED
Status: DISCONTINUED | OUTPATIENT
Start: 2025-01-17 | End: 2025-01-17 | Stop reason: HOSPADM

## 2025-01-17 RX ORDER — FENTANYL CITRATE 50 UG/ML
25 INJECTION, SOLUTION INTRAMUSCULAR; INTRAVENOUS EVERY 5 MIN PRN
Status: DISCONTINUED | OUTPATIENT
Start: 2025-01-17 | End: 2025-01-17 | Stop reason: HOSPADM

## 2025-01-17 RX ORDER — PROPOFOL 10 MG/ML
INJECTION, EMULSION INTRAVENOUS AS NEEDED
Status: DISCONTINUED | OUTPATIENT
Start: 2025-01-17 | End: 2025-01-17

## 2025-01-17 RX ORDER — AMOXICILLIN 250 MG
1 CAPSULE ORAL DAILY
Qty: 7 TABLET | Refills: 2 | Status: SHIPPED | OUTPATIENT
Start: 2025-01-17 | End: 2025-02-07

## 2025-01-17 RX ORDER — LIDOCAINE HYDROCHLORIDE 10 MG/ML
INJECTION, SOLUTION INFILTRATION; PERINEURAL AS NEEDED
Status: DISCONTINUED | OUTPATIENT
Start: 2025-01-17 | End: 2025-01-17 | Stop reason: HOSPADM

## 2025-01-17 RX ORDER — LIDOCAINE HYDROCHLORIDE 10 MG/ML
0.1 INJECTION, SOLUTION EPIDURAL; INFILTRATION; INTRACAUDAL; PERINEURAL ONCE
Status: DISCONTINUED | OUTPATIENT
Start: 2025-01-17 | End: 2025-01-17 | Stop reason: HOSPADM

## 2025-01-17 RX ORDER — CEPHALEXIN 500 MG/1
500 CAPSULE ORAL 3 TIMES DAILY
Qty: 3 CAPSULE | Refills: 0 | Status: SHIPPED | OUTPATIENT
Start: 2025-01-17 | End: 2025-01-18

## 2025-01-17 RX ORDER — LABETALOL HYDROCHLORIDE 5 MG/ML
5 INJECTION, SOLUTION INTRAVENOUS ONCE AS NEEDED
Status: DISCONTINUED | OUTPATIENT
Start: 2025-01-17 | End: 2025-01-17 | Stop reason: HOSPADM

## 2025-01-17 RX ORDER — MEPERIDINE HYDROCHLORIDE 25 MG/ML
12.5 INJECTION INTRAMUSCULAR; INTRAVENOUS; SUBCUTANEOUS EVERY 10 MIN PRN
Status: DISCONTINUED | OUTPATIENT
Start: 2025-01-17 | End: 2025-01-17 | Stop reason: HOSPADM

## 2025-01-17 RX ORDER — LIDOCAINE HYDROCHLORIDE 10 MG/ML
INJECTION, SOLUTION EPIDURAL; INFILTRATION; INTRACAUDAL; PERINEURAL AS NEEDED
Status: DISCONTINUED | OUTPATIENT
Start: 2025-01-17 | End: 2025-01-17

## 2025-01-17 RX ORDER — TRANEXAMIC ACID 100 MG/ML
INJECTION, SOLUTION INTRAVENOUS AS NEEDED
Status: DISCONTINUED | OUTPATIENT
Start: 2025-01-17 | End: 2025-01-17

## 2025-01-17 RX ORDER — VANCOMYCIN HYDROCHLORIDE 1 G/20ML
INJECTION, POWDER, LYOPHILIZED, FOR SOLUTION INTRAVENOUS AS NEEDED
Status: DISCONTINUED | OUTPATIENT
Start: 2025-01-17 | End: 2025-01-17 | Stop reason: HOSPADM

## 2025-01-17 RX ORDER — ONDANSETRON HYDROCHLORIDE 2 MG/ML
INJECTION, SOLUTION INTRAVENOUS AS NEEDED
Status: DISCONTINUED | OUTPATIENT
Start: 2025-01-17 | End: 2025-01-17

## 2025-01-17 RX ORDER — HYDROCODONE BITARTRATE AND ACETAMINOPHEN 5; 325 MG/1; MG/1
1 TABLET ORAL EVERY 6 HOURS PRN
Qty: 28 TABLET | Refills: 0 | Status: SHIPPED | OUTPATIENT
Start: 2025-01-17 | End: 2025-01-24

## 2025-01-17 RX ORDER — ASPIRIN 81 MG/1
81 TABLET ORAL 2 TIMES DAILY
Qty: 28 TABLET | Refills: 0 | Status: SHIPPED | OUTPATIENT
Start: 2025-01-17 | End: 2025-01-31

## 2025-01-17 RX ORDER — OXYCODONE HYDROCHLORIDE 5 MG/1
5 TABLET ORAL EVERY 4 HOURS PRN
Status: DISCONTINUED | OUTPATIENT
Start: 2025-01-17 | End: 2025-01-17 | Stop reason: HOSPADM

## 2025-01-17 RX ORDER — CEFAZOLIN SODIUM 2 G/100ML
2 INJECTION, SOLUTION INTRAVENOUS ONCE
Status: COMPLETED | OUTPATIENT
Start: 2025-01-17 | End: 2025-01-17

## 2025-01-17 RX ORDER — FENTANYL CITRATE 50 UG/ML
50 INJECTION, SOLUTION INTRAMUSCULAR; INTRAVENOUS EVERY 5 MIN PRN
Status: DISCONTINUED | OUTPATIENT
Start: 2025-01-17 | End: 2025-01-17 | Stop reason: HOSPADM

## 2025-01-17 RX ORDER — BUPIVACAINE HYDROCHLORIDE 5 MG/ML
INJECTION, SOLUTION PERINEURAL AS NEEDED
Status: DISCONTINUED | OUTPATIENT
Start: 2025-01-17 | End: 2025-01-17 | Stop reason: HOSPADM

## 2025-01-17 RX ORDER — MIDAZOLAM HYDROCHLORIDE 1 MG/ML
INJECTION, SOLUTION INTRAMUSCULAR; INTRAVENOUS AS NEEDED
Status: DISCONTINUED | OUTPATIENT
Start: 2025-01-17 | End: 2025-01-17

## 2025-01-17 RX ORDER — FENTANYL CITRATE 50 UG/ML
INJECTION, SOLUTION INTRAMUSCULAR; INTRAVENOUS AS NEEDED
Status: DISCONTINUED | OUTPATIENT
Start: 2025-01-17 | End: 2025-01-17

## 2025-01-17 RX ADMIN — CEFAZOLIN SODIUM 2 G: 2 INJECTION, SOLUTION INTRAVENOUS at 09:09

## 2025-01-17 RX ADMIN — MIDAZOLAM 2 MG: 1 INJECTION INTRAMUSCULAR; INTRAVENOUS at 08:58

## 2025-01-17 RX ADMIN — POVIDONE-IODINE 1 APPLICATION: 5 SOLUTION TOPICAL at 08:13

## 2025-01-17 RX ADMIN — SODIUM CHLORIDE, POTASSIUM CHLORIDE, SODIUM LACTATE AND CALCIUM CHLORIDE: 600; 310; 30; 20 INJECTION, SOLUTION INTRAVENOUS at 09:00

## 2025-01-17 RX ADMIN — DEXAMETHASONE SODIUM PHOSPHATE 4 MG: 4 INJECTION, SOLUTION INTRAMUSCULAR; INTRAVENOUS at 09:22

## 2025-01-17 RX ADMIN — TRANEXAMIC ACID 1000 MG: 1 INJECTION, SOLUTION INTRAVENOUS at 09:09

## 2025-01-17 RX ADMIN — ONDANSETRON 4 MG: 2 INJECTION INTRAMUSCULAR; INTRAVENOUS at 09:22

## 2025-01-17 RX ADMIN — FENTANYL CITRATE 25 MCG: 50 INJECTION INTRAMUSCULAR; INTRAVENOUS at 09:05

## 2025-01-17 RX ADMIN — FENTANYL CITRATE 75 MCG: 50 INJECTION INTRAMUSCULAR; INTRAVENOUS at 09:15

## 2025-01-17 RX ADMIN — LIDOCAINE HYDROCHLORIDE 5 ML: 10 INJECTION, SOLUTION EPIDURAL; INFILTRATION; INTRACAUDAL; PERINEURAL at 09:05

## 2025-01-17 RX ADMIN — PROPOFOL 200 MG: 10 INJECTION, EMULSION INTRAVENOUS at 09:05

## 2025-01-17 ASSESSMENT — PAIN - FUNCTIONAL ASSESSMENT
PAIN_FUNCTIONAL_ASSESSMENT: FLACC (FACE, LEGS, ACTIVITY, CRY, CONSOLABILITY)
PAIN_FUNCTIONAL_ASSESSMENT: 0-10

## 2025-01-17 ASSESSMENT — PAIN DESCRIPTION - DESCRIPTORS: DESCRIPTORS: STABBING

## 2025-01-17 ASSESSMENT — PAIN SCALES - GENERAL
PAINLEVEL_OUTOF10: 0 - NO PAIN
PAINLEVEL_OUTOF10: 9
PAINLEVEL_OUTOF10: 0 - NO PAIN
PAINLEVEL_OUTOF10: 0 - NO PAIN

## 2025-01-17 ASSESSMENT — COLUMBIA-SUICIDE SEVERITY RATING SCALE - C-SSRS
2. HAVE YOU ACTUALLY HAD ANY THOUGHTS OF KILLING YOURSELF?: NO
1. IN THE PAST MONTH, HAVE YOU WISHED YOU WERE DEAD OR WISHED YOU COULD GO TO SLEEP AND NOT WAKE UP?: NO
6. HAVE YOU EVER DONE ANYTHING, STARTED TO DO ANYTHING, OR PREPARED TO DO ANYTHING TO END YOUR LIFE?: NO

## 2025-01-17 NOTE — ANESTHESIA PREPROCEDURE EVALUATION
Patient: Ted Tijerina    Procedure Information       Date/Time: 01/17/25 0850    Procedure: Lower Extremity Hardware Removal ( removal crosslock screws and mini frag screws ) - ( hardware removal , C- arm, Flat donavon ) (Left: Knee) - removal crosslock screws and mini frag screws    Location: [facility] / [facility]    Surgeons: Kaden Camahco MD          Past Medical History:   Diagnosis Date    Arthritis     Asthma     Cerebral vascular accident (Multi)     2021    COPD (chronic obstructive pulmonary disease) (Multi)     GERD (gastroesophageal reflux disease)     Hiatal hernia     Hyperlipidemia     Hypertension         Relevant Problems   Cardiac   (+) HTN (hypertension)   (+) Hyperlipidemia      Pulmonary   (+) Asthma   (+) Chronic obstructive pulmonary disease (Multi)      Neuro   (+) CVA (cerebral vascular accident) (Multi)      GI   (+) Gastroesophageal reflux disease   (+) Hiatal hernia       Clinical information reviewed:   Tobacco  Allergies  Meds   Med Hx  Surg Hx   Fam Hx  Soc Hx        NPO Detail:  No data recorded     Physical Exam    Airway  Mallampati: III  TM distance: >3 FB  Neck ROM: full     Cardiovascular    Dental    Pulmonary    Abdominal            Anesthesia Plan    History of general anesthesia?: yes  History of complications of general anesthesia?: no    ASA 3     general     intravenous induction   Anesthetic plan and risks discussed with patient.    Plan discussed with CAA.

## 2025-01-17 NOTE — ANESTHESIA PROCEDURE NOTES
Airway  Date/Time: 1/17/2025 9:07 AM  Urgency: elective    Airway not difficult    Staffing  Performed: BRADEN   Authorized by: Jacob Hampton MD    Performed by: BRADEN Melara  Patient location during procedure: OR    Indications and Patient Condition  Indications for airway management: anesthesia  Spontaneous Ventilation: absent  Sedation level: deep  Preoxygenated: yes  Patient position: sniffing  Mask difficulty assessment: 0 - not attempted  Planned trial extubation    Final Airway Details  Final airway type: supraglottic airway      Successful airway: classic  Size 4     Number of attempts at approach: 1

## 2025-01-17 NOTE — OP NOTE
Lower Extremity Hardware Removal ( removal crosslock screws and mini frag screws ) - ( hardware removal , C- arm, Flat donavon ) (L) Operative Note     Date: 2025  OR Location: [facility]    Name: Ted Tijerina : 1960, Age: 64 y.o., MRN: [id number], Sex: male    Diagnosis  Pre-op Diagnosis      * Pain due to left hip joint prosthesis, initial encounter (CMS-HCC) [T84.84XA, Z96.642] Post-op Diagnosis     * Pain due to left hip joint prosthesis, initial encounter (CMS-HCC) [T84.84XA, Z96.642]     Procedures  Painful symptomatic hardware  left ankle    Painful symptomatic hardware left knee    Surgeons      * Kaden Camacho - Primary    Resident/Fellow/Other Assistant:  Surgeons and Role:     * Myra Corrales PA-C - MANJEET First Assist  First Assistant: Myra Corrales PA-C, who was critical and essential as a first assistant to all aspects including exposure, retraction, in closure as there was no qualified resident available.    Staff:   Circulator: Kailyn Iglesias Person: Rosario    Anesthesia Staff: Anesthesiologist: Jacob Hampton MD  C-AA: BRADEN Melara    Procedure Summary  Anesthesia: General  ASA: III  Estimated Blood Loss: Minimal   Intra-op Medications:   Administrations occurring from 0850 to 1030 on 25:   Medication Name Total Dose   BUPivacaine HCl (Marcaine) 0.5 % (5 mg/mL) injection 5 mL   vancomycin (Vancocin) vial for injection 1 g   lidocaine (Xylocaine) 10 mg/mL (1 %) injection 5 mL   dexAMETHasone (Decadron) 4 mg/mL 4 mg   fentaNYL (Sublimaze) injection 50 mcg/mL 100 mcg   LR bolus Cannot be calculated   lidocaine PF (Xylocaine-MPF) local injection 1 % 5 mL   midazolam (Versed) injection 1 mg/mL 2 mg   ondansetron (Zofran) 2 mg/mL injection 4 mg   propofol (Diprivan) injection 10 mg/mL 200 mg   tranexamic acid (Cyklokapron) injection 1,000 mg   ceFAZolin (Ancef) 2 g in dextrose (iso)  mL 2 g              Anesthesia Record               Intraprocedure I/O Totals           Intake    Tranexamic Acid 0.00 mL    The total shown is the total volume documented since Anesthesia Start was filed.    Total Intake 0 mL          Specimen: No specimens collected              Drains and/or Catheters: * None in log *    Tourniquet Times:         Implants:     Findings: Prominent hardware left knee and left ankle    Indications: Ted Tijerina is an 64 y.o. male who is having surgery for Pain due to left hip joint prosthesis, initial encounter (CMS-[facility]) [T84.84XA, Z96.642].  Presenting for elective hardware removal    The patient was seen in the preoperative area. The risks, benefits, complications, treatment options, non-operative alternatives, expected recovery and outcomes were discussed with the patient. The possibilities of reaction to medication, pulmonary aspiration, injury to surrounding structures, bleeding, recurrent infection, the need for additional procedures, failure to diagnose a condition, and creating a complication requiring transfusion or operation were discussed with the patient. The patient concurred with the proposed plan, giving informed consent.  The site of surgery was properly noted/marked if necessary per policy. The patient has been actively warmed in preoperative area. Preoperative antibiotics have been ordered and given within 1 hours of incision. Venous thrombosis prophylaxis have been ordered including unilateral sequential compression device    Procedure Details: The patient was met in the preoperative holding area and identified by name and medical record number.  He was transferred to the operating room and positioned supine on a flat Dany table.  All bony prominences were padded.  Preoperative antibiotic prophylaxis was administered.  Preoperative timeout was performed myself prior to prepping and the correct operative site was identified.  The limb was then prepped and draped in the usual sterile fashion using ChloraPrep.  We first addressed his ankle  symptomatic screws by making 2 percutaneous stab incisions that we ended up converting to 1 larger incision.  Electrocautery was used to expose both of the screw heads and then removed with a 3.5 mm hex screwdriver.  After confirmatory x-ray was taken and saved we then turned our attention to the left knee.  We made 1 medial incision to remove the 2 medial cross locks followed by percutaneous stab incisions to remove the medial and lateral oblique screws from the nail.  A lateral percutaneous incision was then made to remove the posterior blocking screw.  The 2 screws that were then at the level of the joint were also isolated fluoroscopically a small curette was used to clear out the screw heads and then a 2.5 mm hex screwdriver was used to remove both of the screws.  The gabriella was retained.  We then took final x-rays of the knee and saved them as well.  The wounds were mayela irrigated and closed in layers.  Sterile dressings were applied.    Postoperative plan:    Weightbearing as tolerated left lower extremity.  24 hours of oral antibiotics for infection prophylaxis.  DVT prophylaxis with aspirin.  I will see him back in the office in 2 weeks time for wound check and 2 views left tibia.  Complications:  None; patient tolerated the procedure well.    Disposition: PACU - hemodynamically stable.  Condition: stable     First Assistant: Myra Corrales PA-C, who was critical and essential as a first assistant to all aspects including exposure, retraction, in closure as there was no qualified resident available.    Attending Attestation: I was present and scrubbed for the key portions of the procedure.    Kaden Camacho  Phone Number: [phone]

## 2025-01-17 NOTE — ANESTHESIA POSTPROCEDURE EVALUATION
Patient: Ted Tijerina    Procedure Summary       Date: 01/17/25 Room / Location: [facility] / [facility]    Anesthesia Start: 0900 Anesthesia Stop: 0951    Procedure: Lower Extremity Hardware Removal ( removal crosslock screws and mini frag screws ) - ( hardware removal , C- arm, Flat donavon ) (Left: Knee) Diagnosis:       Pain due to left hip joint prosthesis, initial encounter (CMS-HCC)      (Pain due to left hip joint prosthesis, initial encounter (CMS-HCC) [T84.84XA, Z96.642])    Surgeons: Kaden Camacho MD Responsible Provider: Jacob Hampton MD    Anesthesia Type: general ASA Status: 3            Anesthesia Type: general    Vitals Value Taken Time   /89 01/17/25 0950   Temp 36.3 °C (97.3 °F) 01/17/25 0950   Pulse 57 01/17/25 0950   Resp 12 01/17/25 0950   SpO2 100 % 01/17/25 0950       Anesthesia Post Evaluation    Patient location during evaluation: PACU  Patient participation: waiting for patient participation  Level of consciousness: sleepy but conscious  Pain management: adequate  Airway patency: patent  Cardiovascular status: hemodynamically stable and acceptable  Respiratory status: face mask and spontaneous ventilation  Hydration status: acceptable  Postoperative Nausea and Vomiting: none        There were no known notable events for this encounter.

## 2025-01-20 ASSESSMENT — PAIN SCALES - GENERAL: PAINLEVEL_OUTOF10: 2

## 2025-02-04 ENCOUNTER — HOSPITAL ENCOUNTER (OUTPATIENT)
Dept: RADIOLOGY | Facility: CLINIC | Age: 65
Discharge: HOME | End: 2025-02-04
Payer: MEDICARE

## 2025-02-04 ENCOUNTER — OFFICE VISIT (OUTPATIENT)
Dept: ORTHOPEDIC SURGERY | Facility: CLINIC | Age: 65
End: 2025-02-04
Payer: MEDICARE

## 2025-02-04 DIAGNOSIS — S82.202S CLOSED FRACTURE OF LEFT TIBIA AND FIBULA, SEQUELA: ICD-10-CM

## 2025-02-04 DIAGNOSIS — S82.402S CLOSED FRACTURE OF LEFT TIBIA AND FIBULA, SEQUELA: ICD-10-CM

## 2025-02-04 DIAGNOSIS — S82.402S CLOSED FRACTURE OF LEFT TIBIA AND FIBULA, SEQUELA: Primary | ICD-10-CM

## 2025-02-04 DIAGNOSIS — S82.202S CLOSED FRACTURE OF LEFT TIBIA AND FIBULA, SEQUELA: Primary | ICD-10-CM

## 2025-02-04 PROCEDURE — 73590 X-RAY EXAM OF LOWER LEG: CPT | Mod: LEFT SIDE | Performed by: RADIOLOGY

## 2025-02-04 PROCEDURE — 73610 X-RAY EXAM OF ANKLE: CPT | Mod: LEFT SIDE | Performed by: RADIOLOGY

## 2025-02-04 PROCEDURE — 73610 X-RAY EXAM OF ANKLE: CPT | Mod: LT

## 2025-02-04 PROCEDURE — 99211 OFF/OP EST MAY X REQ PHY/QHP: CPT | Performed by: PHYSICIAN ASSISTANT

## 2025-02-04 PROCEDURE — 73590 X-RAY EXAM OF LOWER LEG: CPT | Mod: LT

## 2025-02-04 RX ORDER — HYDROCODONE BITARTRATE AND ACETAMINOPHEN 5; 325 MG/1; MG/1
1 TABLET ORAL EVERY 6 HOURS PRN
Qty: 20 TABLET | Refills: 0 | Status: SHIPPED | OUTPATIENT
Start: 2025-02-04 | End: 2025-02-09

## 2025-02-10 NOTE — PROGRESS NOTES
History of Present Illness   Ted Tijerina is a 64 y.o. male presenting today for follow up visit from R from [facility] on 1/17/25. Overall doing ok. Has mild pain that is requiring narcotics for pain control. Pain is mostly in his knee. Rates pain a 6/10. Incisions are well healing without redness or drainage. Compliant with WB recommendations. Denies numbness, tingling, f/c, CP, SOB or any other complaints or concerns.      Past Medical History:   Diagnosis Date    Arthritis     Asthma     Cerebral vascular accident (Multi)     2021    COPD (chronic obstructive pulmonary disease) (Multi)     GERD (gastroesophageal reflux disease)     Hiatal hernia     Hyperlipidemia     Hypertension        Medication Documentation Review Audit       Reviewed by Sofia Monteiro LPN (Licensed Nurse) on 02/04/25 at 1428      Medication Order Taking? Sig Documenting Provider Last Dose Status   albuterol 90 mcg/actuation inhaler [id number] Yes  Historical Provider, MD Past Week Active   amLODIPine (Norvasc) 5 mg tablet [id number] Yes Take 1 tablet (5 mg) by mouth once daily. Historical Provider, MD 1/17/2025 Morning Active     Discontinued 02/04/25 1428   atorvastatin (Lipitor) 40 mg tablet [id number] Yes Take 1 tablet (40 mg) by mouth once daily at bedtime. Historical Provider, MD 1/17/2025 Morning Active   hydroCHLOROthiazide (HYDRODiuril) 25 mg tablet [id number] Yes Take 1 tablet (25 mg) by mouth once daily. Historical Provider, MD 1/17/2025 Morning Active   hydrOXYzine HCL (Atarax) 10 mg tablet [id number] Yes  Historical Provider, MD 1/17/2025 Morning Active   lisinopril 2.5 mg tablet [id number] Yes Take 1 tablet (2.5 mg) by mouth once daily. Historical Provider, MD 1/17/2025 Morning Active   sennosides-docusate sodium (Kyara-Colace) 8.6-50 mg tablet [id number] Yes Take 1 tablet by mouth once daily for 21 days. Myra Corrales PA-C  Active                    No Known Allergies    Social History     Socioeconomic History    Marital status:       Spouse name: Not on file    Number of children: Not on file    Years of education: Not on file    Highest education level: Not on file   Occupational History    Not on file   Tobacco Use    Smoking status: Every Day     Types: Cigarettes    Smokeless tobacco: Current   Substance and Sexual Activity    Alcohol use: Not Currently    Drug use: Yes     Types: Marijuana    Sexual activity: Defer   Other Topics Concern    Not on file   Social History Narrative    Not on file     Social Drivers of Health     Financial Resource Strain: Not on file   Food Insecurity: Not on file   Transportation Needs: Not on file   Physical Activity: Not on file   Stress: Not on file   Social Connections: Not on file   Intimate Partner Violence: Not on file   Housing Stability: Not on file       Past Surgical History:   Procedure Laterality Date    ORIF TIBIA FRACTURE            Review of Systems:  ROS reviewed and negative other than as listed in the HPI     Physical Exam:  Gen: The pt is A&Ox3, NAD, and appear state age and weight  Psychiatric: mood and affect are appropriate   Eyes: sclera are white, EOM grossly intact  ENT: MMM  Neck: supple, thyroid is midline  Respiratory: respirations are nonlabored, chest rise symmetric  CV: rate is regular by palpation of distal pulses  Abdomen: nondistended   Integument: no obvious cutaneous lesions noted. No signs of lymphangitis. No signs of systemic edema.   MSK:  E  surgical incision well healing without edema, erythema, drainage, or other s/sx of infection. Appropriately tender to palpation around the incision. SILT throughout the leg intact. Intact plantarflexion and dorsiflexion. Foot warm and well perfused.      Imaging:  I personally reviewed multiple views of the  L tib/fib  were obtained in the office today demonstrate maintenance of reduction s/p removal of the hardware. He has mild OA changes in the knee.      Assessment   64 y.o. male post-op from R from [facility] on 2/17/25.      Plan:  Continue WBAT on  LLE: will refer for PT  Incisions well healing; sutures/staples removed in office and steri's placed with wound care instructed  Continue DVT ppx and vit d/calcium for bone health  Pt requesting refill of narcotics. OARSS was reviewed and not concerning for signs of abuse thus in setting of acute post-operative period patient provided a refill after counseling on risk of addiction.      Follow-up 2 months with 2 views left knee and 2 views L tib/fib.     All of the patient's questions/concerns address and they are in agreement with the plan.                R Brow Units: 0

## 2025-04-10 ENCOUNTER — APPOINTMENT (OUTPATIENT)
Dept: RADIOLOGY | Facility: CLINIC | Age: 65
End: 2025-04-10
Payer: MEDICARE

## 2025-04-30 ENCOUNTER — OFFICE VISIT (OUTPATIENT)
Dept: ORTHOPEDIC SURGERY | Facility: CLINIC | Age: 65
End: 2025-04-30
Payer: MEDICARE

## 2025-04-30 ENCOUNTER — HOSPITAL ENCOUNTER (OUTPATIENT)
Dept: RADIOLOGY | Facility: CLINIC | Age: 65
Discharge: HOME | End: 2025-04-30
Payer: MEDICARE

## 2025-04-30 DIAGNOSIS — S82.202S CLOSED FRACTURE OF LEFT TIBIA AND FIBULA, SEQUELA: Primary | ICD-10-CM

## 2025-04-30 DIAGNOSIS — S82.202S CLOSED FRACTURE OF LEFT TIBIA AND FIBULA, SEQUELA: ICD-10-CM

## 2025-04-30 DIAGNOSIS — S82.402S CLOSED FRACTURE OF LEFT TIBIA AND FIBULA, SEQUELA: ICD-10-CM

## 2025-04-30 DIAGNOSIS — S82.402S CLOSED FRACTURE OF LEFT TIBIA AND FIBULA, SEQUELA: Primary | ICD-10-CM

## 2025-04-30 PROCEDURE — 73590 X-RAY EXAM OF LOWER LEG: CPT | Mod: LT

## 2025-04-30 PROCEDURE — 99214 OFFICE O/P EST MOD 30 MIN: CPT | Performed by: ORTHOPAEDIC SURGERY

## 2025-04-30 PROCEDURE — 73560 X-RAY EXAM OF KNEE 1 OR 2: CPT | Mod: LT

## 2025-04-30 PROCEDURE — 73560 X-RAY EXAM OF KNEE 1 OR 2: CPT | Mod: LEFT SIDE | Performed by: RADIOLOGY

## 2025-04-30 PROCEDURE — 73590 X-RAY EXAM OF LOWER LEG: CPT | Mod: LEFT SIDE | Performed by: RADIOLOGY

## 2025-04-30 RX ORDER — MELOXICAM 15 MG/1
15 TABLET ORAL DAILY
Qty: 30 TABLET | Refills: 1 | Status: SHIPPED | OUTPATIENT
Start: 2025-04-30 | End: 2025-06-29

## 2025-04-30 ASSESSMENT — PAIN - FUNCTIONAL ASSESSMENT: PAIN_FUNCTIONAL_ASSESSMENT: 0-10

## 2025-04-30 ASSESSMENT — PAIN DESCRIPTION - DESCRIPTORS: DESCRIPTORS: ACHING

## 2025-04-30 ASSESSMENT — PAIN SCALES - GENERAL: PAINLEVEL_OUTOF10: 10 - WORST POSSIBLE PAIN

## 2025-04-30 NOTE — PROGRESS NOTES
64-year-old male now 3 months out from left tibial nail cross lock screw removal for localized pain.  Unfortunately he still having pain in his medial ankle more distal to the malleolus and globally about the left knee.  He presents with his wife and states that his lower leg pain prevents him from even standing without feeling balanced.  He is in a wheelchair for the visit today.  He has not been very active due to this pain.    The patient does not endorse fevers and chills. The patient does not endorse any change in her vision or hearing. They do not endorse chest pain, shortness of breath. The patient does not endorse any abdominal discomfort. They do not endorse any skin irritation or lesions. They do not endorse any new numbness and tingling or as otherwise stated in the history of present illness.    He is in no acute distress, alert and oriented x 3.    Mood and affect are appropriate.    Respirations are unlabored.    Distal limb is pink and well perfused.    Left lower extremity evaluation demonstrates well-healed surgical incisions.  He is not tender to palpation over the areas of his previous incisions but has some ankle pain more distal and medial to his medial malleolus.  He also is having some global knee pain with palpation and with passive motion.    Multiple views of his tibia today represent for healing of his previous fracture.  He does not really have degenerative arthritic changes that are advanced in the knee and his ankle joint looks well-preserved.    64-year-old male with residual left-sided lower limb pain following tibial nail and screw removal.  I am worried there might be something more significant going on based on the severity of his pain.  Like to get an MRI of his ankle and his knee with metal suppression to evaluate further.  He is also complaining of back pain and weakness in his legs in general and I prescribed physical therapy today.  Meloxicam was also ordered.  I will get back  to him as soon as his MRI results are completed.    Natural History reviewed. All questions answered. The patient was in agreement with the plan.      **This note was created using voice recognition software and was not corrected for typographical or grammatical errors.**

## 2025-05-13 ENCOUNTER — HOSPITAL ENCOUNTER (OUTPATIENT)
Dept: RADIOLOGY | Facility: HOSPITAL | Age: 65
Discharge: HOME | End: 2025-05-13
Payer: MEDICARE

## 2025-05-13 DIAGNOSIS — S82.402S CLOSED FRACTURE OF LEFT TIBIA AND FIBULA, SEQUELA: ICD-10-CM

## 2025-05-13 DIAGNOSIS — S82.202S CLOSED FRACTURE OF LEFT TIBIA AND FIBULA, SEQUELA: ICD-10-CM

## 2025-05-13 PROCEDURE — 73721 MRI JNT OF LWR EXTRE W/O DYE: CPT | Mod: LT

## 2025-05-15 ENCOUNTER — APPOINTMENT (OUTPATIENT)
Dept: RADIOLOGY | Facility: CLINIC | Age: 65
End: 2025-05-15
Payer: MEDICARE

## 2025-06-04 ENCOUNTER — APPOINTMENT (OUTPATIENT)
Dept: RADIOLOGY | Facility: HOSPITAL | Age: 65
End: 2025-06-04
Payer: MEDICARE

## 2025-06-05 ENCOUNTER — HOSPITAL ENCOUNTER (OUTPATIENT)
Dept: RADIOLOGY | Facility: HOSPITAL | Age: 65
Discharge: HOME | End: 2025-06-05
Payer: MEDICARE

## 2025-06-05 DIAGNOSIS — S82.402S CLOSED FRACTURE OF LEFT TIBIA AND FIBULA, SEQUELA: ICD-10-CM

## 2025-06-05 DIAGNOSIS — S82.202S CLOSED FRACTURE OF LEFT TIBIA AND FIBULA, SEQUELA: ICD-10-CM

## 2025-06-05 PROCEDURE — 73721 MRI JNT OF LWR EXTRE W/O DYE: CPT | Mod: LT

## 2025-06-13 DIAGNOSIS — S82.202S CLOSED FRACTURE OF LEFT TIBIA AND FIBULA, SEQUELA: Primary | ICD-10-CM

## 2025-06-13 DIAGNOSIS — S82.402S CLOSED FRACTURE OF LEFT TIBIA AND FIBULA, SEQUELA: Primary | ICD-10-CM

## 2025-06-26 ENCOUNTER — TELEPHONE (OUTPATIENT)
Dept: ORTHOPEDIC SURGERY | Facility: HOSPITAL | Age: 65
End: 2025-06-26
Payer: MEDICARE

## 2025-06-30 ENCOUNTER — APPOINTMENT (OUTPATIENT)
Dept: PAIN MEDICINE | Facility: HOSPITAL | Age: 65
End: 2025-06-30
Payer: MEDICARE

## 2025-06-30 ENCOUNTER — OFFICE VISIT (OUTPATIENT)
Dept: PAIN MEDICINE | Facility: HOSPITAL | Age: 65
End: 2025-06-30
Payer: MEDICARE

## 2025-06-30 ENCOUNTER — HOSPITAL ENCOUNTER (OUTPATIENT)
Dept: RADIOLOGY | Facility: HOSPITAL | Age: 65
Discharge: HOME | End: 2025-06-30
Payer: MEDICARE

## 2025-06-30 DIAGNOSIS — M79.2 INTRACTABLE NEUROPATHIC PAIN OF LEFT LOWER EXTREMITY: ICD-10-CM

## 2025-06-30 DIAGNOSIS — S82.402S CLOSED FRACTURE OF LEFT TIBIA AND FIBULA, SEQUELA: ICD-10-CM

## 2025-06-30 DIAGNOSIS — S82.202S CLOSED FRACTURE OF LEFT TIBIA AND FIBULA, SEQUELA: ICD-10-CM

## 2025-06-30 DIAGNOSIS — M79.2 INTRACTABLE NEUROPATHIC PAIN OF LEFT LOWER EXTREMITY: Primary | ICD-10-CM

## 2025-06-30 DIAGNOSIS — M47.817 LUMBOSACRAL SPONDYLOSIS WITHOUT MYELOPATHY: ICD-10-CM

## 2025-06-30 PROCEDURE — 99214 OFFICE O/P EST MOD 30 MIN: CPT | Performed by: PAIN MEDICINE

## 2025-06-30 PROCEDURE — 72110 X-RAY EXAM L-2 SPINE 4/>VWS: CPT | Performed by: STUDENT IN AN ORGANIZED HEALTH CARE EDUCATION/TRAINING PROGRAM

## 2025-06-30 PROCEDURE — 72120 X-RAY BEND ONLY L-S SPINE: CPT

## 2025-06-30 PROCEDURE — 99204 OFFICE O/P NEW MOD 45 MIN: CPT | Performed by: PAIN MEDICINE

## 2025-06-30 RX ORDER — DULOXETIN HYDROCHLORIDE 30 MG/1
30 CAPSULE, DELAYED RELEASE ORAL DAILY
Qty: 7 CAPSULE | Refills: 0 | Status: SHIPPED | OUTPATIENT
Start: 2025-06-30 | End: 2025-07-07

## 2025-06-30 RX ORDER — DULOXETIN HYDROCHLORIDE 60 MG/1
60 CAPSULE, DELAYED RELEASE ORAL DAILY
Qty: 30 CAPSULE | Refills: 11 | Status: SHIPPED | OUTPATIENT
Start: 2025-06-30 | End: 2026-06-30

## 2025-06-30 ASSESSMENT — PAIN SCALES - GENERAL: PAINLEVEL_OUTOF10: 8

## 2025-06-30 ASSESSMENT — PAIN DESCRIPTION - DESCRIPTORS: DESCRIPTORS: NUMBNESS;TINGLING;SHOOTING

## 2025-06-30 ASSESSMENT — PAIN - FUNCTIONAL ASSESSMENT: PAIN_FUNCTIONAL_ASSESSMENT: 0-10
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
